# Patient Record
Sex: MALE | Race: WHITE | NOT HISPANIC OR LATINO | URBAN - METROPOLITAN AREA
[De-identification: names, ages, dates, MRNs, and addresses within clinical notes are randomized per-mention and may not be internally consistent; named-entity substitution may affect disease eponyms.]

---

## 2023-11-15 ENCOUNTER — APPOINTMENT (RX ONLY)
Dept: URBAN - METROPOLITAN AREA CLINIC 144 | Facility: CLINIC | Age: 63
Setting detail: DERMATOLOGY
End: 2023-11-15

## 2023-11-15 DIAGNOSIS — M79.64 PAIN IN HAND AND FINGERS: ICD-10-CM

## 2023-11-15 PROBLEM — M79.643 PAIN IN UNSPECIFIED HAND: Status: ACTIVE | Noted: 2023-11-15

## 2023-11-15 PROBLEM — M79.646 PAIN IN UNSPECIFIED FINGER(S): Status: ACTIVE | Noted: 2023-11-15

## 2023-11-15 PROCEDURE — ? ADDITIONAL NOTES

## 2023-11-15 PROCEDURE — 99202 OFFICE O/P NEW SF 15 MIN: CPT

## 2023-11-15 NOTE — PROCEDURE: ADDITIONAL NOTES
Render Risk Assessment In Note?: no
Additional Notes: Advised to d/c finger splint. Advised we should order an MRI to see if there is any imaging. Advised could be arthritis vs gout. Advised to follow up as well with Dr. Keanu Flores his pcp. Informed patient that if finger begins to bend, to call asap. Pt understands.